# Patient Record
Sex: FEMALE | Race: WHITE
[De-identification: names, ages, dates, MRNs, and addresses within clinical notes are randomized per-mention and may not be internally consistent; named-entity substitution may affect disease eponyms.]

---

## 2018-01-23 ENCOUNTER — HOSPITAL ENCOUNTER (OUTPATIENT)
Dept: HOSPITAL 13 - SDC | Age: 83
LOS: 1 days | Discharge: HOME | End: 2018-01-24
Attending: ORTHOPAEDIC SURGERY | Admitting: ORTHOPAEDIC SURGERY
Payer: COMMERCIAL

## 2018-01-23 VITALS — BODY MASS INDEX: 29.69 KG/M2 | WEIGHT: 151.24 LBS | HEIGHT: 60 IN

## 2018-01-23 VITALS — SYSTOLIC BLOOD PRESSURE: 177 MMHG | DIASTOLIC BLOOD PRESSURE: 80 MMHG

## 2018-01-23 VITALS — DIASTOLIC BLOOD PRESSURE: 60 MMHG | SYSTOLIC BLOOD PRESSURE: 139 MMHG

## 2018-01-23 VITALS — DIASTOLIC BLOOD PRESSURE: 81 MMHG | SYSTOLIC BLOOD PRESSURE: 140 MMHG

## 2018-01-23 DIAGNOSIS — I10: ICD-10-CM

## 2018-01-23 DIAGNOSIS — M25.711: ICD-10-CM

## 2018-01-23 DIAGNOSIS — M19.011: Primary | ICD-10-CM

## 2018-01-23 DIAGNOSIS — Z79.82: ICD-10-CM

## 2018-01-23 PROCEDURE — 0RRJ0J7 REPLACEMENT OF RIGHT SHOULDER JOINT WITH SYNTHETIC SUBSTITUTE, GLENOID SURFACE, OPEN APPROACH: ICD-10-PCS | Performed by: ORTHOPAEDIC SURGERY

## 2018-01-23 PROCEDURE — G0378 HOSPITAL OBSERVATION PER HR: HCPCS

## 2018-01-23 PROCEDURE — C1713 ANCHOR/SCREW BN/BN,TIS/BN: HCPCS

## 2018-01-24 VITALS — SYSTOLIC BLOOD PRESSURE: 110 MMHG | DIASTOLIC BLOOD PRESSURE: 56 MMHG

## 2018-01-24 VITALS — SYSTOLIC BLOOD PRESSURE: 134 MMHG | DIASTOLIC BLOOD PRESSURE: 59 MMHG

## 2018-01-24 VITALS — DIASTOLIC BLOOD PRESSURE: 61 MMHG | SYSTOLIC BLOOD PRESSURE: 135 MMHG

## 2018-03-17 ENCOUNTER — HOSPITAL ENCOUNTER (EMERGENCY)
Dept: HOSPITAL 13 - EME | Age: 83
Discharge: HOME | End: 2018-03-17
Payer: COMMERCIAL

## 2018-03-17 VITALS — HEIGHT: 61 IN | WEIGHT: 140.21 LBS | BODY MASS INDEX: 26.47 KG/M2

## 2018-03-17 VITALS — SYSTOLIC BLOOD PRESSURE: 165 MMHG | DIASTOLIC BLOOD PRESSURE: 67 MMHG

## 2018-03-17 DIAGNOSIS — I10: ICD-10-CM

## 2018-03-17 DIAGNOSIS — Z91.040: ICD-10-CM

## 2018-03-17 DIAGNOSIS — S76.012A: Primary | ICD-10-CM
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
## 2018-04-03 ENCOUNTER — HOSPITAL ENCOUNTER (INPATIENT)
Dept: HOSPITAL 13 - EME | Age: 83
LOS: 3 days | Discharge: NURSING, REHAB OR LONG-TERM CARE | DRG: 553 | End: 2018-04-06
Attending: HOSPITALIST | Admitting: HOSPITALIST
Payer: COMMERCIAL

## 2018-04-03 VITALS — BODY MASS INDEX: 26.84 KG/M2 | WEIGHT: 136.69 LBS | HEIGHT: 60 IN

## 2018-04-03 VITALS — DIASTOLIC BLOOD PRESSURE: 62 MMHG | SYSTOLIC BLOOD PRESSURE: 135 MMHG

## 2018-04-03 VITALS — DIASTOLIC BLOOD PRESSURE: 59 MMHG | SYSTOLIC BLOOD PRESSURE: 115 MMHG

## 2018-04-03 DIAGNOSIS — I25.10: ICD-10-CM

## 2018-04-03 DIAGNOSIS — M76.02: ICD-10-CM

## 2018-04-03 DIAGNOSIS — Z79.82: ICD-10-CM

## 2018-04-03 DIAGNOSIS — E78.5: ICD-10-CM

## 2018-04-03 DIAGNOSIS — Z91.040: ICD-10-CM

## 2018-04-03 DIAGNOSIS — I12.9: ICD-10-CM

## 2018-04-03 DIAGNOSIS — G89.29: ICD-10-CM

## 2018-04-03 DIAGNOSIS — M17.12: Primary | ICD-10-CM

## 2018-04-03 DIAGNOSIS — G92: ICD-10-CM

## 2018-04-03 DIAGNOSIS — T40.605A: ICD-10-CM

## 2018-04-03 DIAGNOSIS — Z96.611: ICD-10-CM

## 2018-04-03 DIAGNOSIS — N18.3: ICD-10-CM

## 2018-04-03 LAB
25(OH)D3 SERPL-MCNC: 2.4 MG/DL (ref 1.3–2.7)
ALBUMIN SERPL-MCNC: 4.4 G/DL (ref 3.2–4.8)
ALP SERPL-CCNC: 80 IU/L (ref 3–129)
ALT (GPT): 18 IU/L (ref 3–49)
AST (GOT): 20 IU/L (ref 2–34)
BASOPHIL (%): 0.8 % (ref 0–1)
BASOPHILS # BLD AUTO: 14.6 G/DL (ref 11.9–15.5)
CHLORIDE: 104 MEQ/L (ref 99–109)
CREATININE: 1.2 MG/DL (ref 0.6–1.3)
EOSINOPHIL # BLD: 0.1 K/UL (ref 0–0.3)
EOSINOPHIL (%): 1.1 % (ref 0–5)
GFR SERPLBLD CREATININE-BSD FMLAMALE: 46 ML/MIN/
GLUCOSE SERPL-MCNC: 97 MG/DL (ref 70–99)
HCO3 BLD-SCNC: 27 MEQ/L (ref 20–31)
HCT VFR BLD CALC: 42 % (ref 36–46)
IMMATURE GRANULOCYTE (%): 0.3 % (ref 0–0.7)
LYMPHOCYTE COUNT: 1.9 K/UL (ref 1–2.8)
MCH RBC QN AUTO: 31.8 PG (ref 29–34)
MCV: 91.5 FL (ref 83–99)
MONOCYTE (%): 9.9 % (ref 3–12)
MONOCYTES # BLD MANUAL: 34.8 G/DL (ref 30–36)
MONOCYTES # BLD: 0.9 K/UL (ref 0–0.8)
NEUTROPHIL (%): 66.5 % (ref 45–76)
NEUTROPHIL COUNT: 5.9 K/UL (ref 1.8–6.4)
NRBC (%): 0 /100 WBC (ref 0–0)
OCT SER-CCNC: 21.4 % (ref 15–42)
PAPPENHEIMER BOD BLD QL SMEAR: 0.1 K/UL (ref 0–0.1)
PLATELET COUNT: 210 K/UL (ref 156–360)
POTASSIUM SERPL-SCNC: 4.3 MEQ/L (ref 3.7–5.4)
RBC # BLD AUTO: 4.59 M/UL (ref 3.8–5.2)
RBC DIS.WIDTH-CV: 12.3 % (ref 11.8–14.6)
RBC DIS.WIDTH-SD: 40.8 % (ref 39–53)
SODIUM: 142 MEQ/L (ref 136–147)
TOTAL BILIRUBIN: 0.7 MG/DL (ref 0–1)
TOTAL PROTEIN: 7 G/DL (ref 6.4–8.3)
UREA NITROGEN (BUN): 20 MG/DL (ref 9–23)
WBC # BLD AUTO: 8.9 K/UL (ref 4.1–10.2)

## 2018-04-03 PROCEDURE — G0378 HOSPITAL OBSERVATION PER HR: HCPCS

## 2018-04-04 VITALS — SYSTOLIC BLOOD PRESSURE: 138 MMHG | DIASTOLIC BLOOD PRESSURE: 66 MMHG

## 2018-04-04 VITALS — SYSTOLIC BLOOD PRESSURE: 141 MMHG | DIASTOLIC BLOOD PRESSURE: 65 MMHG

## 2018-04-04 VITALS — SYSTOLIC BLOOD PRESSURE: 117 MMHG | DIASTOLIC BLOOD PRESSURE: 57 MMHG

## 2018-04-04 VITALS — DIASTOLIC BLOOD PRESSURE: 58 MMHG | SYSTOLIC BLOOD PRESSURE: 115 MMHG

## 2018-04-04 LAB
BASOPHILS # BLD AUTO: 13.2 G/DL (ref 11.9–15.5)
CHLORIDE: 107 MEQ/L (ref 99–109)
CREATININE: 1.2 MG/DL (ref 0.6–1.3)
GFR SERPLBLD CREATININE-BSD FMLAMALE: 46 ML/MIN/
GLUCOSE SERPL-MCNC: 95 MG/DL (ref 70–99)
HCO3 BLD-SCNC: 28 MEQ/L (ref 20–31)
HCT VFR BLD CALC: 39 % (ref 36–46)
HDL CHOLESTEROL: 46 MG/DL
LDL CHOLESTEROL: 114 MG/DL
MCH RBC QN AUTO: 31.6 PG (ref 29–34)
MCV: 93.3 FL (ref 83–99)
MONOCYTES # BLD MANUAL: 33.8 G/DL (ref 30–36)
NON-HDL CHOLESTEROL: 143 MG/DL
NRBC (%): 0 /100 WBC (ref 0–0)
PLATELET COUNT: 196 K/UL (ref 156–360)
POTASSIUM SERPL-SCNC: 4 MEQ/L (ref 3.7–5.4)
RBC # BLD AUTO: 4.18 M/UL (ref 3.8–5.2)
RBC DIS.WIDTH-CV: 12.4 % (ref 11.8–14.6)
RBC DIS.WIDTH-SD: 42.3 % (ref 39–53)
SODIUM: 143 MEQ/L (ref 136–147)
TOTAL CHOLESTEROL: 189 MG/DL
TRIGLYCERIDES: 147 MG/DL
UREA NITROGEN (BUN): 24 MG/DL (ref 9–23)
WBC # BLD AUTO: 7.5 K/UL (ref 4.1–10.2)

## 2018-04-05 VITALS — SYSTOLIC BLOOD PRESSURE: 166 MMHG | DIASTOLIC BLOOD PRESSURE: 73 MMHG

## 2018-04-05 VITALS — SYSTOLIC BLOOD PRESSURE: 212 MMHG | DIASTOLIC BLOOD PRESSURE: 91 MMHG

## 2018-04-05 VITALS — DIASTOLIC BLOOD PRESSURE: 72 MMHG | SYSTOLIC BLOOD PRESSURE: 162 MMHG

## 2018-04-05 VITALS — DIASTOLIC BLOOD PRESSURE: 70 MMHG | SYSTOLIC BLOOD PRESSURE: 170 MMHG

## 2018-04-05 VITALS — DIASTOLIC BLOOD PRESSURE: 98 MMHG | SYSTOLIC BLOOD PRESSURE: 171 MMHG

## 2018-04-05 VITALS — SYSTOLIC BLOOD PRESSURE: 124 MMHG | DIASTOLIC BLOOD PRESSURE: 81 MMHG

## 2018-04-05 VITALS — DIASTOLIC BLOOD PRESSURE: 61 MMHG | SYSTOLIC BLOOD PRESSURE: 132 MMHG

## 2018-04-06 VITALS — SYSTOLIC BLOOD PRESSURE: 100 MMHG | DIASTOLIC BLOOD PRESSURE: 50 MMHG

## 2018-04-06 VITALS — SYSTOLIC BLOOD PRESSURE: 138 MMHG | DIASTOLIC BLOOD PRESSURE: 63 MMHG

## 2018-04-06 VITALS — SYSTOLIC BLOOD PRESSURE: 101 MMHG | DIASTOLIC BLOOD PRESSURE: 69 MMHG

## 2018-04-06 VITALS — DIASTOLIC BLOOD PRESSURE: 65 MMHG | SYSTOLIC BLOOD PRESSURE: 150 MMHG

## 2018-07-31 ENCOUNTER — APPOINTMENT (OUTPATIENT)
Age: 83
Setting detail: DERMATOLOGY
End: 2018-07-31

## 2018-07-31 DIAGNOSIS — L81.4 OTHER MELANIN HYPERPIGMENTATION: ICD-10-CM

## 2018-07-31 DIAGNOSIS — L57.0 ACTINIC KERATOSIS: ICD-10-CM

## 2018-07-31 PROBLEM — D48.5 NEOPLASM OF UNCERTAIN BEHAVIOR OF SKIN: Status: ACTIVE | Noted: 2018-07-31

## 2018-07-31 PROCEDURE — OTHER BIOPSY BY PUNCH METHOD: OTHER

## 2018-07-31 PROCEDURE — OTHER BIOPSY BY SHAVE METHOD: OTHER

## 2018-07-31 PROCEDURE — 11100: CPT

## 2018-07-31 PROCEDURE — 99202 OFFICE O/P NEW SF 15 MIN: CPT | Mod: 25

## 2018-07-31 PROCEDURE — OTHER COUNSELING: OTHER

## 2018-07-31 PROCEDURE — OTHER ADDITIONAL NOTES: OTHER

## 2018-07-31 PROCEDURE — 11101: CPT

## 2018-07-31 PROCEDURE — OTHER MIPS QUALITY: OTHER

## 2018-07-31 ASSESSMENT — LOCATION DETAILED DESCRIPTION DERM
LOCATION DETAILED: LEFT VENTRAL PROXIMAL FOREARM
LOCATION DETAILED: RIGHT VENTRAL PROXIMAL FOREARM
LOCATION DETAILED: RIGHT INFERIOR MEDIAL FOREHEAD

## 2018-07-31 ASSESSMENT — LOCATION ZONE DERM
LOCATION ZONE: ARM
LOCATION ZONE: FACE

## 2018-07-31 ASSESSMENT — LOCATION SIMPLE DESCRIPTION DERM
LOCATION SIMPLE: RIGHT FOREHEAD
LOCATION SIMPLE: LEFT FOREARM
LOCATION SIMPLE: RIGHT FOREARM

## 2018-07-31 NOTE — PROCEDURE: BIOPSY BY PUNCH METHOD
Wound Care: Bacitracin
Size Of Lesion In Cm (Optional): 0
Billing Type: Third-Party Bill
Suture Removal: 14 days
Anesthesia Type: 1% lidocaine with epinephrine
Render Post-Care Instructions In Note?: no
Epidermal Sutures: 4-0 Ethilon
Biopsy Type: H and E
Notification Instructions: Patient will be notified of biopsy results. However, patient instructed to call the office if not contacted within 2 weeks.
Post-Care Instructions: I reviewed with the patient in detail post-care instructions. Patient is to keep the biopsy site dry overnight, and then apply bacitracin twice daily until healed. Patient may apply hydrogen peroxide soaks to remove any crusting.
Was A Bandage Applied: Yes
Punch Size In Mm: 3
Home Suture Removal Text: Patient was provided a home suture removal kit and will remove their sutures at home.  If they have any questions or difficulties they will call the office.
Anesthesia Volume In Cc (Will Not Render If 0): 0.5
Consent: Written consent was obtained and risks were reviewed including but not limited to scarring, infection, bleeding, scabbing, incomplete removal, nerve damage and allergy to anesthesia.
Hemostasis: None
Dressing: bandage
Detail Level: Detailed

## 2018-07-31 NOTE — PROCEDURE: BIOPSY BY SHAVE METHOD
Notification Instructions: Patient will be notified of biopsy results. However, patient instructed to call the office if not contacted within 2 weeks.
Was A Bandage Applied: Yes
X Size Of Lesion In Cm: 0
Biopsy Type: H and E
Billing Type: Third-Party Bill
Silver Nitrate Text: The wound bed was treated with silver nitrate after the biopsy was performed.
Electrodesiccation Text: The wound bed was treated with electrodesiccation after the biopsy was performed.
Electrodesiccation And Curettage Text: The wound bed was treated with electrodesiccation and curettage after the biopsy was performed.
Wound Care: Petrolatum
Render Post-Care Instructions In Note?: no
Post-Care Instructions: I reviewed with the patient in detail post-care instructions. Patient is to keep the biopsy site dry overnight, and then apply bacitracin twice daily until healed. Patient may apply hydrogen peroxide soaks to remove any crusting.
Curettage Text: The wound bed was treated with curettage after the biopsy was performed.
Cryotherapy Text: The wound bed was treated with cryotherapy after the biopsy was performed.
Hemostasis: Drysol
Biopsy Method: Dermablade
Dressing: bandage
Detail Level: Detailed
Consent: Written consent was obtained and risks were reviewed including but not limited to scarring, infection, bleeding, scabbing, incomplete removal, nerve damage and allergy to anesthesia.
Size Of Lesion In Cm: 1
Anesthesia Volume In Cc (Will Not Render If 0): 0.5
Depth Of Biopsy: dermis
Type Of Destruction Used: Curettage
Anesthesia Type: 1% lidocaine with epinephrine

## 2018-07-31 NOTE — PROCEDURE: ADDITIONAL NOTES
Additional Notes: Rec bxs of growths noted to clarify, pt agreeable. Issue is above rct to Lidocaine. D/w pt using alternate Amide anesthetic(Mepivicaine) as typically very low risk for cross reactivity, p understanding potential risks pt agreed and bxs done. Further recs based on path. RTC in 1wk for SR/re-eval if no intv issues
Additional Notes: No clear concerning pigmented lesions noted in areas examined. Most prominent R FA and will obs curr

## 2018-07-31 NOTE — PROCEDURE: MIPS QUALITY
Quality 431: Preventive Care And Screening: Unhealthy Alcohol Use - Screening: Patient screened for unhealthy alcohol use using a single question and scores less than 2 times per year
Quality 110: Preventive Care And Screening: Influenza Immunization: Influenza Immunization previously received during influenza season
Quality 131: Pain Assessment And Follow-Up: Pain assessment using a standardized tool is documented as negative, no follow-up plan required
Quality 402: Tobacco Use And Help With Quitting Among Adolescents: Patient screened for tobacco and never smoked
Quality 111:Pneumonia Vaccination Status For Older Adults: Pneumococcal Vaccination Previously Received
Detail Level: Detailed
Quality 130: Documentation Of Current Medications In The Medical Record: Current Medications Documented

## 2018-08-07 ENCOUNTER — APPOINTMENT (OUTPATIENT)
Age: 83
Setting detail: DERMATOLOGY
End: 2018-08-07

## 2018-08-07 DIAGNOSIS — L57.0 ACTINIC KERATOSIS: ICD-10-CM

## 2018-08-07 DIAGNOSIS — Z48.02 ENCOUNTER FOR REMOVAL OF SUTURES: ICD-10-CM

## 2018-08-07 PROBLEM — C44.311 BASAL CELL CARCINOMA OF SKIN OF NOSE: Status: ACTIVE | Noted: 2018-08-07

## 2018-08-07 PROBLEM — C44.319 BASAL CELL CARCINOMA OF SKIN OF OTHER PARTS OF FACE: Status: ACTIVE | Noted: 2018-08-07

## 2018-08-07 PROCEDURE — 17000 DESTRUCT PREMALG LESION: CPT

## 2018-08-07 PROCEDURE — OTHER REFERRAL: OTHER

## 2018-08-07 PROCEDURE — OTHER MIPS QUALITY: OTHER

## 2018-08-07 PROCEDURE — OTHER SUTURE REMOVAL (GLOBAL PERIOD): OTHER

## 2018-08-07 PROCEDURE — OTHER COUNSELING: OTHER

## 2018-08-07 PROCEDURE — OTHER LIQUID NITROGEN: OTHER

## 2018-08-07 PROCEDURE — OTHER ADDITIONAL NOTES: OTHER

## 2018-08-07 PROCEDURE — OTHER CONSULTATION FOR MOHS SURGERY: OTHER

## 2018-08-07 ASSESSMENT — LOCATION DETAILED DESCRIPTION DERM
LOCATION DETAILED: INFERIOR MID FOREHEAD
LOCATION DETAILED: INFERIOR MID FOREHEAD

## 2018-08-07 ASSESSMENT — LOCATION ZONE DERM
LOCATION ZONE: FACE
LOCATION ZONE: FACE

## 2018-08-07 ASSESSMENT — LOCATION SIMPLE DESCRIPTION DERM
LOCATION SIMPLE: INFERIOR FOREHEAD
LOCATION SIMPLE: INFERIOR FOREHEAD

## 2018-08-07 NOTE — PROCEDURE: MIPS QUALITY
Quality 131: Pain Assessment And Follow-Up: Pain assessment using a standardized tool is documented as negative, no follow-up plan required
Detail Level: Detailed
Quality 110: Preventive Care And Screening: Influenza Immunization: Influenza Immunization previously received during influenza season
Quality 111:Pneumonia Vaccination Status For Older Adults: Pneumococcal Vaccination Previously Received
Quality 431: Preventive Care And Screening: Unhealthy Alcohol Use - Screening: Patient screened for unhealthy alcohol use using a single question and scores less than 2 times per year
Quality 130: Documentation Of Current Medications In The Medical Record: Current Medications Documented
Quality 402: Tobacco Use And Help With Quitting Among Adolescents: Patient screened for tobacco and never smoked

## 2018-08-07 NOTE — PROCEDURE: SUTURE REMOVAL (GLOBAL PERIOD)
Detail Level: Detailed
Add 90255 Cpt? (Important Note: In 2017 The Use Of 93529 Is Being Tracked By Cms To Determine Future Global Period Reimbursement For Global Periods): no

## 2018-08-07 NOTE — PROCEDURE: ADDITIONAL NOTES
Additional Notes: D/w pt rec for Mohs and agreeable, will refer to Plastic Surgery for closure p Mohs. Will coordinate scheduling c Plastic Surgery
Additional Notes: Plastics to close Dr Samaniego

## 2018-08-07 NOTE — PROCEDURE: CONSULTATION FOR MOHS SURGERY
Detail Level: Detailed
Body Location Override (Optional - Billing Will Still Be Based On Selected Body Map Location If Applicable): nasal root
X Size Of Lesion In Cm (Optional): 0
Incorporate Mauc In Note: Yes

## 2018-11-13 ENCOUNTER — APPOINTMENT (OUTPATIENT)
Age: 83
Setting detail: DERMATOLOGY
End: 2018-11-13

## 2018-11-13 PROBLEM — C44.311 BASAL CELL CARCINOMA OF SKIN OF NOSE: Status: ACTIVE | Noted: 2018-11-13

## 2018-11-13 PROCEDURE — OTHER MOHS SURGERY: OTHER

## 2018-11-13 PROCEDURE — OTHER MIPS QUALITY: OTHER

## 2018-11-13 PROCEDURE — 17312 MOHS ADDL STAGE: CPT

## 2018-11-13 PROCEDURE — 17311 MOHS 1 STAGE H/N/HF/G: CPT

## 2018-11-13 NOTE — PROCEDURE: MIPS QUALITY
Quality 110: Preventive Care And Screening: Influenza Immunization: Influenza Immunization previously received during influenza season
Quality 111:Pneumonia Vaccination Status For Older Adults: Pneumococcal Vaccination Previously Received
Quality 402: Tobacco Use And Help With Quitting Among Adolescents: Patient screened for tobacco and never smoked
Detail Level: Detailed
Quality 131: Pain Assessment And Follow-Up: Pain assessment using a standardized tool is documented as negative, no follow-up plan required
Quality 431: Preventive Care And Screening: Unhealthy Alcohol Use - Screening: Patient screened for unhealthy alcohol use using a single question and scores less than 2 times per year
Quality 130: Documentation Of Current Medications In The Medical Record: Current Medications Documented

## 2018-12-17 ENCOUNTER — APPOINTMENT (OUTPATIENT)
Age: 83
Setting detail: DERMATOLOGY
End: 2018-12-17

## 2018-12-17 DIAGNOSIS — Z85.828 PERSONAL HISTORY OF OTHER MALIGNANT NEOPLASM OF SKIN: ICD-10-CM

## 2018-12-17 PROCEDURE — OTHER MIPS QUALITY: OTHER

## 2018-12-17 PROCEDURE — OTHER REASSURANCE: OTHER

## 2018-12-17 PROCEDURE — 99213 OFFICE O/P EST LOW 20 MIN: CPT

## 2018-12-17 PROCEDURE — OTHER ADDITIONAL NOTES: OTHER

## 2018-12-17 PROCEDURE — OTHER COUNSELING: OTHER

## 2018-12-17 ASSESSMENT — LOCATION ZONE DERM: LOCATION ZONE: NOSE

## 2018-12-17 ASSESSMENT — LOCATION SIMPLE DESCRIPTION DERM: LOCATION SIMPLE: NOSE

## 2018-12-17 ASSESSMENT — LOCATION DETAILED DESCRIPTION DERM: LOCATION DETAILED: NASAL DORSUM

## 2018-12-17 NOTE — PROCEDURE: MIPS QUALITY
Quality 431: Preventive Care And Screening: Unhealthy Alcohol Use - Screening: Patient screened for unhealthy alcohol use using a single question and scores less than 2 times per year
Quality 111:Pneumonia Vaccination Status For Older Adults: Pneumococcal Vaccination Previously Received
Quality 110: Preventive Care And Screening: Influenza Immunization: Influenza Immunization previously received during influenza season
Quality 131: Pain Assessment And Follow-Up: Pain assessment using a standardized tool is documented as negative, no follow-up plan required
Detail Level: Detailed
Quality 402: Tobacco Use And Help With Quitting Among Adolescents: Patient screened for tobacco and never smoked
Quality 130: Documentation Of Current Medications In The Medical Record: Current Medications Documented

## 2018-12-17 NOTE — PROCEDURE: ADDITIONAL NOTES
Detail Level: Simple
Additional Notes: Mohs site healing well c no evidence residual growth, flap repair. Cont to monitor clinically. RTC in 6m for re-eval if no intv issues

## 2021-06-28 NOTE — PROCEDURE: MOHS SURGERY
Right PNL Melolabial Transposition Flap Text: The defect edges were debeveled with a #15 scalpel blade.  Given the location of the defect and the proximity to free margins a melolabial flap was deemed most appropriate.  Using a sterile surgical marker, an appropriate melolabial transposition flap was drawn incorporating the defect.    The area thus outlined was incised deep to adipose tissue with a #15 scalpel blade.  The skin margins were undermined to an appropriate distance in all directions utilizing iris scissors.

## 2024-09-05 NOTE — PROCEDURE: MOHS SURGERY
no Cheiloplasty (Complex) Text: A decision was made to reconstruct the defect with a  cheiloplasty.  The defect was undermined extensively.  Additional obicularis oris muscle was excised with a 15 blade scalpel.  The defect was converted into a full thickness wedge to facilite a better cosmetic result.  Small vessels were then tied off with 5-0 monocyrl. The obicularis oris, superficial fascia, adipose and dermis were then reapproximated.  After the deeper layers were approximated the epidermis was reapproximated with particular care given to realign the vermilion border.

## 2025-06-24 NOTE — PROCEDURE: MOHS SURGERY
Mohs Method Verbiage: An incision at a 45 degree angle following the standard Mohs approach was done and the specimen was harvested as a microscopic controlled layer.